# Patient Record
Sex: MALE | Race: WHITE | Employment: OTHER | URBAN - METROPOLITAN AREA
[De-identification: names, ages, dates, MRNs, and addresses within clinical notes are randomized per-mention and may not be internally consistent; named-entity substitution may affect disease eponyms.]

---

## 2018-08-20 NOTE — PATIENT DISCUSSION
PVD OU:  Patient was cautioned to call our office immediately if they experience a substantial change in their symptoms such as an increase in floaters persistent flashes loss of visual field (may appear as a shadow or a curtain) or decrease in visual acuity as these may indicate a retinal tear or detachment.   If this is a new problem patient will need to return for re-examination  as determined by the physician1-3 mos DFE

## 2019-04-30 NOTE — PATIENT DISCUSSION
1.  Pseudophakia OU - IOLs stable. Monitor. S/P PPV with REshift OD IOL shift? Recheck in a month before deciding on Rx for glasses. 2. Epiretinal Membrane OU - 3. PVD OD: Patient was cautioned to call our office immediately if they experience a substantial change in their symptoms such as an increase in floaters persistent flashes loss of visual field (may appear as a shadow or a curtain) or decrease in visual acuity as these may indicate a retinal tear or detachment.   If this is a new problem patient will need to return for re-examination  as determined by the physician

## 2019-05-30 NOTE — PATIENT DISCUSSION
1.  PVD OD: Patient was cautioned to call our office immediately if they experience a substantial change in their symptoms such as an increase in floaters persistent flashes loss of visual field (may appear as a shadow or a curtain) or decrease in visual acuity as these may indicate a retinal tear or detachment. If this is a new problem patient will need to return for re-examination  as determined by the [address]. Epiretinal Membrane OU - 3. Pseudophakia OU - IOLs stable. Monitor. 4.   Rx given

## 2022-03-25 ENCOUNTER — NEW PATIENT (OUTPATIENT)
Dept: URBAN - METROPOLITAN AREA CLINIC 36 | Facility: CLINIC | Age: 72
End: 2022-03-25

## 2022-03-25 DIAGNOSIS — H25.812: ICD-10-CM

## 2022-03-25 DIAGNOSIS — H52.7: ICD-10-CM

## 2022-03-25 DIAGNOSIS — H25.811: ICD-10-CM

## 2022-03-25 PROCEDURE — 92004 COMPRE OPH EXAM NEW PT 1/>: CPT

## 2022-03-25 PROCEDURE — 92015 DETERMINE REFRACTIVE STATE: CPT

## 2022-03-25 ASSESSMENT — TONOMETRY
OD_IOP_MMHG: 16
OS_IOP_MMHG: 14

## 2022-03-25 ASSESSMENT — VISUAL ACUITY
OD_SC: 20/30+1
OS_SC: 20/25-1
OD_SC: J3
OS_SC: J3

## 2024-01-22 NOTE — PATIENT DISCUSSION
VITROUS OPACITY IMPROVED, STILL COMPLAINS OF NOT SEEING CLEAR FOR NEAR AND DISTANT WITH EVEN GLASSES. TO SEE  [facility] FOR REFRACTION. THEN RECHECK. - - -